# Patient Record
Sex: MALE | Race: WHITE
[De-identification: names, ages, dates, MRNs, and addresses within clinical notes are randomized per-mention and may not be internally consistent; named-entity substitution may affect disease eponyms.]

---

## 2017-02-27 ENCOUNTER — HOSPITAL ENCOUNTER (OUTPATIENT)
Age: 77
Discharge: HOME | End: 2017-02-27
Payer: MEDICARE

## 2017-02-27 DIAGNOSIS — E87.1: Primary | ICD-10-CM

## 2020-01-20 ENCOUNTER — HOSPITAL ENCOUNTER (EMERGENCY)
Dept: HOSPITAL 76 - ED | Age: 80
Discharge: HOME | End: 2020-01-20
Payer: MEDICARE

## 2020-01-20 VITALS — SYSTOLIC BLOOD PRESSURE: 170 MMHG | DIASTOLIC BLOOD PRESSURE: 69 MMHG

## 2020-01-20 DIAGNOSIS — I10: Primary | ICD-10-CM

## 2020-01-20 DIAGNOSIS — F41.9: ICD-10-CM

## 2020-01-20 DIAGNOSIS — R01.1: ICD-10-CM

## 2020-01-20 LAB
ALBUMIN DIAFP-MCNC: 4.9 G/DL (ref 3.2–5.5)
ALBUMIN/GLOB SERPL: 1.6 {RATIO} (ref 1–2.2)
ALP SERPL-CCNC: 31 IU/L (ref 42–121)
ALT SERPL W P-5'-P-CCNC: 26 IU/L (ref 10–60)
ANION GAP SERPL CALCULATED.4IONS-SCNC: 11 MMOL/L (ref 6–13)
AST SERPL W P-5'-P-CCNC: 26 IU/L (ref 10–42)
BASOPHILS NFR BLD AUTO: 0 10^3/UL (ref 0–0.1)
BASOPHILS NFR BLD AUTO: 0.4 %
BILIRUB BLD-MCNC: 1 MG/DL (ref 0.2–1)
BUN SERPL-MCNC: 20 MG/DL (ref 6–20)
CALCIUM UR-MCNC: 9.5 MG/DL (ref 8.5–10.3)
CHLORIDE SERPL-SCNC: 93 MMOL/L (ref 101–111)
CO2 SERPL-SCNC: 24 MMOL/L (ref 21–32)
CREAT SERPLBLD-SCNC: 1.1 MG/DL (ref 0.6–1.2)
EOSINOPHIL # BLD AUTO: 0.1 10^3/UL (ref 0–0.7)
EOSINOPHIL NFR BLD AUTO: 0.9 %
ERYTHROCYTE [DISTWIDTH] IN BLOOD BY AUTOMATED COUNT: 13.5 % (ref 12–15)
GFRSERPLBLD MDRD-ARVRAT: 65 ML/MIN/{1.73_M2} (ref 89–?)
GLOBULIN SER-MCNC: 3 G/DL (ref 2.1–4.2)
GLUCOSE SERPL-MCNC: 138 MG/DL (ref 70–100)
HGB UR QL STRIP: 14.9 G/DL (ref 14–18)
LIPASE SERPL-CCNC: 26 U/L (ref 22–51)
LYMPHOCYTES # SPEC AUTO: 2 10^3/UL (ref 1.5–3.5)
LYMPHOCYTES NFR BLD AUTO: 29.7 %
MCH RBC QN AUTO: 30.3 PG (ref 27–31)
MCHC RBC AUTO-ENTMCNC: 34.3 G/DL (ref 32–36)
MCV RBC AUTO: 88.6 FL (ref 80–94)
MONOCYTES # BLD AUTO: 0.5 10^3/UL (ref 0–1)
MONOCYTES NFR BLD AUTO: 6.7 %
NEUTROPHILS # BLD AUTO: 4.3 10^3/UL (ref 1.5–6.6)
NEUTROPHILS # SNV AUTO: 6.9 X10^3/UL (ref 4.8–10.8)
NEUTROPHILS NFR BLD AUTO: 61.9 %
PDW BLD AUTO: 8.4 FL (ref 7.4–11.4)
PLATELET # BLD: 240 10^3/UL (ref 130–450)
PROT SPEC-MCNC: 7.9 G/DL (ref 6.7–8.2)
RBC MAR: 4.91 10^6/UL (ref 4.7–6.1)
SODIUM SERPLBLD-SCNC: 128 MMOL/L (ref 135–145)

## 2020-01-20 PROCEDURE — 80053 COMPREHEN METABOLIC PANEL: CPT

## 2020-01-20 PROCEDURE — 36415 COLL VENOUS BLD VENIPUNCTURE: CPT

## 2020-01-20 PROCEDURE — 93005 ELECTROCARDIOGRAM TRACING: CPT

## 2020-01-20 PROCEDURE — 83690 ASSAY OF LIPASE: CPT

## 2020-01-20 PROCEDURE — 85025 COMPLETE CBC W/AUTO DIFF WBC: CPT

## 2020-01-20 PROCEDURE — 84484 ASSAY OF TROPONIN QUANT: CPT

## 2020-01-20 PROCEDURE — 99283 EMERGENCY DEPT VISIT LOW MDM: CPT

## 2020-01-20 NOTE — ED PHYSICIAN DOCUMENTATION
History of Present Illness





- Stated complaint


Stated Complaint: HIGH BP





- Chief complaint


Chief Complaint: General





- History obtained from


History obtained from: Patient





- History of Present Illness


Timing: Last night (79-year-old gentleman with longstanding hypertension, takes 

lisinopril, 40 mg at night, hydralazine 3 times daily and amlodipine 10 mg once 

a day.  He started taking his blood pressure again recently and its been 

reliably high in the last night he felt chilled and had a panic attack in the 

middle of the night.  He is seen a nephrologist before but he says he is never 

had any trouble with his kidneys, also has seen a cardiologist and no history of

heart disease.)





Review of Systems


Constitutional: reports: Reviewed and negative


Cardiac: denies: Chest pain / pressure, Palpitations, Pedal edema, Calf pain


Respiratory: denies: Dyspnea, Cough





PD PAST MEDICAL HISTORY





- Present Medications


Home Medications: 


                                Ambulatory Orders











 Medication  Instructions  Recorded  Confirmed


 


Lisinopril 40 mg PO QPM 03/29/16 03/29/16


 


amLODIPine [Norvasc] 10 mg PO DAILY 03/29/16 03/29/16


 


Lorazepam [Ativan] 1 mg PO TID PRN #7 tablet 01/20/20 


 


hydrALAZINE [Apresoline] 25 mg PO TID 01/20/20 01/20/20














- Allergies


Allergies/Adverse Reactions: 


                                    Allergies











Allergy/AdvReac Type Severity Reaction Status Date / Time


 


No Known Drug Allergies Allergy   Verified 01/20/20 18:20














PD ED PE NORMAL





- Vitals


Vital signs reviewed: Yes





- General


General: Alert and oriented X 3, No acute distress





- HEENT


HEENT: PERRL, EOMI





- Neck


Neck: Supple, no meningeal sign, No bony TTP





- Cardiac


Cardiac: RRR, Other (Subtle systolic murmur heard best at the left upper sternal

 border)





- Respiratory


Respiratory: Clear bilaterally





- Abdomen


Abdomen: Non tender





- Extremities


Extremities: No edema, No calf tenderness / cord





- Neuro


Neuro: Alert and oriented X 3, Normal speech





Results





- Vitals


Vitals: 


                               Vital Signs - 24 hr











  01/20/20





  18:13


 


Temperature 36.7 C


 


Heart Rate 84


 


Respiratory 18





Rate 


 


Blood Pressure 177/74 H


 


O2 Saturation 97








                                     Oxygen











O2 Source                      Room air

















- EKG (time done)


  ** 1825


Rate: Rate (enter#) (74)


Rhythm: NSR, FRANCESCA


Axis: Normal


Intervals: Normal LA


Ischemia: Q waves (anterior).  No: ST elevation c/w ischemia


Computer interpretation: Agree with computer





- Labs


Labs: 


                                Laboratory Tests











  01/20/20 01/20/20 01/20/20





  19:50 19:50 19:50


 


WBC  6.9  


 


RBC  4.91  


 


Hgb  14.9  


 


Hct  43.5  


 


MCV  88.6  


 


MCH  30.3  


 


MCHC  34.3  


 


RDW  13.5  


 


Plt Count  240  


 


MPV  8.4  


 


Neut # (Auto)  4.3  


 


Lymph # (Auto)  2.0  


 


Mono # (Auto)  0.5  


 


Eos # (Auto)  0.1  


 


Baso # (Auto)  0.0  


 


Absolute Nucleated RBC  0.00  


 


Nucleated RBC %  0.0  


 


Sodium   128 L 


 


Potassium   3.9 


 


Chloride   93 L 


 


Carbon Dioxide   24 


 


Anion Gap   11.0 


 


BUN   20 


 


Creatinine   1.1 


 


Estimated GFR (MDRD)   65 L 


 


Glucose   138 H 


 


Calcium   9.5 


 


Total Bilirubin   1.0 


 


AST   26 


 


ALT   26 


 


Alkaline Phosphatase   31 L 


 


Troponin I High Sens    11.6


 


Total Protein   7.9 


 


Albumin   4.9 


 


Globulin   3.0 


 


Albumin/Globulin Ratio   1.6 


 


Lipase   26 














PD MEDICAL DECISION MAKING





- ED course


ED course: 





79-year-old gentleman presents with elevated blood pressures recently, admits 

that a lot of it may be anxiety.  Did not want me to change his blood pressure 

meds tonight, plans to see his physician tomorrow for that but did want 

something for anxiety and he was given a milligram of Ativan to go.





Departure





- Departure


Disposition: 01 Home, Self Care


Clinical Impression: 


Hypertension


Qualifiers:


 Hypertension type: essential hypertension Qualified Code(s): I10 - Essential 

(primary) hypertension





Condition: Good


Record reviewed to determine appropriate education?: Yes


Instructions:  ED HTN Established


Prescriptions: 


Lorazepam [Ativan] 1 mg PO TID PRN #7 tablet


 PRN Reason: Anxiety


Comments: 


Your kidney function is good, no recent evidence of heart damage.  You can talk 

with your doctor about changing or adding to your blood pressure medicines, but 

in the meantime you can take the Ativan as needed if anxiety is bad.  Do not 

drink or drive with the Ativan.

## 2020-05-21 ENCOUNTER — HOSPITAL ENCOUNTER (OUTPATIENT)
Dept: HOSPITAL 76 - LAB.WCP | Age: 80
Discharge: HOME | End: 2020-05-21
Attending: FAMILY MEDICINE
Payer: MEDICARE

## 2020-05-21 DIAGNOSIS — I10: Primary | ICD-10-CM

## 2020-05-21 LAB
ALBUMIN DIAFP-MCNC: 4.8 G/DL (ref 3.2–5.5)
ALBUMIN/GLOB SERPL: 1.5 {RATIO} (ref 1–2.2)
ALP SERPL-CCNC: 38 IU/L (ref 42–121)
ALT SERPL W P-5'-P-CCNC: 26 IU/L (ref 10–60)
ANION GAP SERPL CALCULATED.4IONS-SCNC: 10 MMOL/L (ref 6–13)
AST SERPL W P-5'-P-CCNC: 28 IU/L (ref 10–42)
BASOPHILS NFR BLD AUTO: 0 10^3/UL (ref 0–0.1)
BASOPHILS NFR BLD AUTO: 0.4 %
BILIRUB BLD-MCNC: 0.8 MG/DL (ref 0.2–1)
BUN SERPL-MCNC: 16 MG/DL (ref 6–20)
CALCIUM UR-MCNC: 9.8 MG/DL (ref 8.5–10.3)
CHLORIDE SERPL-SCNC: 92 MMOL/L (ref 101–111)
CO2 SERPL-SCNC: 28 MMOL/L (ref 21–32)
CREAT SERPLBLD-SCNC: 0.9 MG/DL (ref 0.6–1.2)
EOSINOPHIL # BLD AUTO: 0 10^3/UL (ref 0–0.7)
EOSINOPHIL NFR BLD AUTO: 0.4 %
ERYTHROCYTE [DISTWIDTH] IN BLOOD BY AUTOMATED COUNT: 13.7 % (ref 12–15)
GLOBULIN SER-MCNC: 3.2 G/DL (ref 2.1–4.2)
GLUCOSE SERPL-MCNC: 104 MG/DL (ref 70–100)
HGB UR QL STRIP: 14 G/DL (ref 14–18)
LYMPHOCYTES # SPEC AUTO: 1.2 10^3/UL (ref 1.5–3.5)
LYMPHOCYTES NFR BLD AUTO: 21.4 %
MCH RBC QN AUTO: 29.5 PG (ref 27–31)
MCHC RBC AUTO-ENTMCNC: 33.2 G/DL (ref 32–36)
MCV RBC AUTO: 89 FL (ref 80–94)
MONOCYTES # BLD AUTO: 0.4 10^3/UL (ref 0–1)
MONOCYTES NFR BLD AUTO: 6.4 %
NEUTROPHILS # BLD AUTO: 4 10^3/UL (ref 1.5–6.6)
NEUTROPHILS # SNV AUTO: 5.6 X10^3/UL (ref 4.8–10.8)
NEUTROPHILS NFR BLD AUTO: 71.2 %
PDW BLD AUTO: 9 FL (ref 7.4–11.4)
PLATELET # BLD: 209 10^3/UL (ref 130–450)
PROT SPEC-MCNC: 8 G/DL (ref 6.7–8.2)
RBC MAR: 4.74 10^6/UL (ref 4.7–6.1)
SODIUM SERPLBLD-SCNC: 130 MMOL/L (ref 135–145)

## 2020-05-21 PROCEDURE — 84443 ASSAY THYROID STIM HORMONE: CPT

## 2020-05-21 PROCEDURE — 85025 COMPLETE CBC W/AUTO DIFF WBC: CPT

## 2020-05-21 PROCEDURE — 80053 COMPREHEN METABOLIC PANEL: CPT

## 2020-05-21 PROCEDURE — 36415 COLL VENOUS BLD VENIPUNCTURE: CPT

## 2020-07-09 ENCOUNTER — HOSPITAL ENCOUNTER (EMERGENCY)
Dept: HOSPITAL 76 - ED | Age: 80
Discharge: ANOTHER HOSPITAL | End: 2020-07-09
Payer: MEDICARE

## 2020-07-09 VITALS — DIASTOLIC BLOOD PRESSURE: 61 MMHG | SYSTOLIC BLOOD PRESSURE: 180 MMHG

## 2020-07-09 DIAGNOSIS — R94.31: ICD-10-CM

## 2020-07-09 DIAGNOSIS — R07.9: Primary | ICD-10-CM

## 2020-07-09 DIAGNOSIS — R00.1: ICD-10-CM

## 2020-07-09 DIAGNOSIS — I45.5: ICD-10-CM

## 2020-07-09 DIAGNOSIS — I10: ICD-10-CM

## 2020-07-09 DIAGNOSIS — E87.1: ICD-10-CM

## 2020-07-09 LAB
ALBUMIN DIAFP-MCNC: 4.2 G/DL (ref 3.2–5.5)
ALBUMIN/GLOB SERPL: 1.4 {RATIO} (ref 1–2.2)
ALP SERPL-CCNC: 161 IU/L (ref 42–121)
ALT SERPL W P-5'-P-CCNC: 65 IU/L (ref 10–60)
ANION GAP SERPL CALCULATED.4IONS-SCNC: 12 MMOL/L (ref 6–13)
AST SERPL W P-5'-P-CCNC: 68 IU/L (ref 10–42)
BASOPHILS NFR BLD AUTO: 0 10^3/UL (ref 0–0.1)
BASOPHILS NFR BLD AUTO: 0.3 %
BILIRUB BLD-MCNC: 1 MG/DL (ref 0.2–1)
BUN SERPL-MCNC: 15 MG/DL (ref 6–20)
CALCIUM UR-MCNC: 8.9 MG/DL (ref 8.5–10.3)
CHLORIDE SERPL-SCNC: 83 MMOL/L (ref 101–111)
CO2 SERPL-SCNC: 23 MMOL/L (ref 21–32)
CREAT SERPLBLD-SCNC: 0.8 MG/DL (ref 0.6–1.2)
EOSINOPHIL # BLD AUTO: 0 10^3/UL (ref 0–0.7)
EOSINOPHIL NFR BLD AUTO: 0.2 %
ERYTHROCYTE [DISTWIDTH] IN BLOOD BY AUTOMATED COUNT: 13.3 % (ref 12–15)
GLOBULIN SER-MCNC: 2.9 G/DL (ref 2.1–4.2)
GLUCOSE SERPL-MCNC: 128 MG/DL (ref 70–100)
HGB UR QL STRIP: 12.6 G/DL (ref 14–18)
LIPASE SERPL-CCNC: 20 U/L (ref 22–51)
LYMPHOCYTES # SPEC AUTO: 1 10^3/UL (ref 1.5–3.5)
LYMPHOCYTES NFR BLD AUTO: 10.6 %
MCH RBC QN AUTO: 30.6 PG (ref 27–31)
MCHC RBC AUTO-ENTMCNC: 35.9 G/DL (ref 32–36)
MCV RBC AUTO: 85.2 FL (ref 80–94)
MONOCYTES # BLD AUTO: 0.5 10^3/UL (ref 0–1)
MONOCYTES NFR BLD AUTO: 5.7 %
NEUTROPHILS # BLD AUTO: 7.7 10^3/UL (ref 1.5–6.6)
NEUTROPHILS # SNV AUTO: 9.4 X10^3/UL (ref 4.8–10.8)
NEUTROPHILS NFR BLD AUTO: 82.6 %
PDW BLD AUTO: 8.7 FL (ref 7.4–11.4)
PLATELET # BLD: 152 10^3/UL (ref 130–450)
PROT SPEC-MCNC: 7.1 G/DL (ref 6.7–8.2)
RBC MAR: 4.12 10^6/UL (ref 4.7–6.1)
SODIUM SERPLBLD-SCNC: 118 MMOL/L (ref 135–145)

## 2020-07-09 PROCEDURE — 83690 ASSAY OF LIPASE: CPT

## 2020-07-09 PROCEDURE — 93005 ELECTROCARDIOGRAM TRACING: CPT

## 2020-07-09 PROCEDURE — 36415 COLL VENOUS BLD VENIPUNCTURE: CPT

## 2020-07-09 PROCEDURE — 71045 X-RAY EXAM CHEST 1 VIEW: CPT

## 2020-07-09 PROCEDURE — 84484 ASSAY OF TROPONIN QUANT: CPT

## 2020-07-09 PROCEDURE — 85025 COMPLETE CBC W/AUTO DIFF WBC: CPT

## 2020-07-09 PROCEDURE — 80053 COMPREHEN METABOLIC PANEL: CPT

## 2020-07-09 PROCEDURE — 99285 EMERGENCY DEPT VISIT HI MDM: CPT

## 2020-07-09 PROCEDURE — 96374 THER/PROPH/DIAG INJ IV PUSH: CPT

## 2020-07-09 NOTE — ED PHYSICIAN DOCUMENTATION
PD HPI CHEST PAIN





- Stated complaint


Stated Complaint: CP/NECK PX





- History obtained from


History obtained from: Patient, Family





- History of Present Illness


Timing - onset: Other (unclear duration (see narrative below))


Timing - onset during: Sleep


Timing - details: Abrupt onset


Pain level now: 3


Quality: Pain


Location: Substernal


Radiation: Neck, Abdominal


Improved by: Nothing


Worsened by: Other (no exacerbating factors)


Associated symptoms: General Weakness


Similar symptoms before: Has not had sx before


Recently seen: Not recently seen





- Additional information


Additional information: 





c/o chest pain as part of a vague HPI that includes neck and upper abdominal 

pain. When I ask him to specifically tell me about the chest pain, he insists on

giving details about changes in his blood pressure medications and doses that 

occurred several weeks ago. He does offer chest pain as part of his chief 

complaint. He also says he feels "sick" and "not myself", as well as difficulty 

with mental focus. 





Review of Systems


Constitutional: reports: Fatigue.  denies: Fever, Chills, Sweats


Eyes: reports: Reviewed and negative


Ears: reports: Reviewed and negative


Nose: reports: Reviewed and negative


Throat: reports: Reviewed and negative


Cardiac: reports: Chest pain / pressure.  denies: Palpitations, Pedal edema, 

Calf pain


Respiratory: denies: Dyspnea, Cough


GI: reports: Abdominal Pain.  denies: Nausea, Vomiting


: denies: Dysuria, Frequency


Skin: reports: Reviewed and negative


Musculoskeletal: reports: Neck pain.  denies: Back pain, Extremity pain


Neurologic: denies: Generalized weakness, Focal weakness, Numbness, Headache, 

Head injury





PD PAST MEDICAL HISTORY





- Past Medical History


Cardiovascular: Hypertension, High cholesterol


Respiratory: None


Neuro: None


Endocrine/Autoimmune: None


GI: None


: None


HEENT: None


Psych: None


Musculoskeletal: None


Derm: None





- Past Surgical History


Past Surgical History: Yes





- Present Medications


Home Medications: 


                                Ambulatory Orders











 Medication  Instructions  Recorded  Confirmed


 


Lisinopril 40 mg PO QPM 03/29/16 03/29/16


 


amLODIPine [Norvasc] 10 mg PO DAILY 03/29/16 03/29/16


 


Lorazepam [Ativan] 1 mg PO TID PRN #7 tablet 01/20/20 


 


hydrALAZINE [Apresoline] 25 mg PO TID 01/20/20 01/20/20














- Allergies


Allergies/Adverse Reactions: 


                                    Allergies











Allergy/AdvReac Type Severity Reaction Status Date / Time


 


No Known Drug Allergies Allergy   Verified 01/20/20 18:20














- Social History


Does the pt smoke?: No


Smoking Status: Never smoker


Does the pt drink ETOH?: Yes


Does the pt have substance abuse?: No





- Immunizations


Immunizations are current?: Yes





- POLST


Patient has POLST: No





PD ED PE NORMAL





- Vitals


Vital signs reviewed: Yes





- General


General: Alert and oriented X 3, No acute distress, Well developed/nourished, 

Other (tangential at times)





- HEENT


HEENT: PERRL, EOMI





- Neck


Neck: Supple, no meningeal sign





- Cardiac


Cardiac: RRR, No murmur





- Respiratory


Respiratory: No respiratory distress, Clear bilaterally





- Abdomen


Abdomen: Soft, Non tender





- Derm


Derm: Normal color, Warm and dry





- Extremities


Extremities: No edema





- Neuro


Neuro: Alert and oriented X 3, CNs 2-12 intact, No motor deficit, No sensory 

deficit, Normal speech


Eye Opening: Spontaneous


Motor: Obeys Commands


Verbal: Oriented


GCS Score: 15





Results





- Vitals


Vitals: 


                               Vital Signs - 24 hr











  07/09/20 07/09/20 07/09/20





  04:00 04:23 04:27


 


Temperature 36.5 C  


 


Heart Rate 54 L 53 L 


 


Respiratory 22 14 





Rate   


 


Blood Pressure 213/70 H  


 


Blood Pressure   213/70 H





[Left]   


 


Blood Pressure   221/70 H





[Right]   


 


O2 Saturation 100 98 














  07/09/20 07/09/20 07/09/20





  04:30 04:41 04:44


 


Temperature   


 


Heart Rate 52 L 73 57 L


 


Respiratory 14 20 20





Rate   


 


Blood Pressure 198/60 H 131/63 H 178/75 H


 


Blood Pressure   





[Left]   


 


Blood Pressure   





[Right]   


 


O2 Saturation 99 99 98














  07/09/20 07/09/20 07/09/20





  04:47 04:51 04:57


 


Temperature   


 


Heart Rate 49 L 48 L 47 L


 


Respiratory 20 18 17





Rate   


 


Blood Pressure 178/61 H 182/65 H 180/61 H


 


Blood Pressure   





[Left]   


 


Blood Pressure   





[Right]   


 


O2 Saturation 98 99 98








                                     Oxygen











O2 Source                      Nasal cannula

















- EKG (time done)


  ** #1


Rate: Rate (enter#) (51), Moises


Rhythm: Sinus bradycardia


Axis: Normal


Intervals: Normal GA


QRS: Normal


Ischemia: Q waves (V2, V3), Other (minimal ST elevation V2, V3 )





  ** #2


Rate: Rate (enter#) (54), Moises


Rhythm: Sinus bradycardia


Axis: Normal


Intervals: Normal GA


QRS: Normal


Ischemia: Q waves (V2, V3), Other (minimal ST elevation V2, V3)





  ** #3


Rate: Rate (enter#) (48)


Rhythm: Sinus bradycardia


Axis: Normal


Intervals: Normal GA


QRS: Normal


Ischemia: Q waves (V2, V3), Other (minimal ST elevation (up to 1 mm) V2, V3)





- Labs


Labs: 


                                Laboratory Tests











  07/09/20 07/09/20 07/09/20





  04:11 04:11 04:11


 


WBC  9.4  


 


RBC  4.12 L  


 


Hgb  12.6 L  


 


Hct  35.1 L  


 


MCV  85.2  


 


MCH  30.6  


 


MCHC  35.9  


 


RDW  13.3  


 


Plt Count  152  


 


MPV  8.7  


 


Neut # (Auto)  7.7 H  


 


Lymph # (Auto)  1.0 L  


 


Mono # (Auto)  0.5  


 


Eos # (Auto)  0.0  


 


Baso # (Auto)  0.0  


 


Absolute Nucleated RBC  0.00  


 


Nucleated RBC %  0.0  


 


Sodium   118 L* 


 


Potassium   4.3 


 


Chloride   83 L 


 


Carbon Dioxide   23 


 


Anion Gap   12.0 


 


BUN   15 


 


Creatinine   0.8 


 


Estimated GFR (MDRD)   93 


 


Glucose   128 H 


 


Calcium   8.9 


 


Total Bilirubin   1.0 


 


AST   68 H 


 


ALT   65 H 


 


Alkaline Phosphatase   161 H 


 


Troponin I High Sens    118.1 H*


 


Total Protein   7.1 


 


Albumin   4.2 


 


Globulin   2.9 


 


Albumin/Globulin Ratio   1.4 


 


Lipase   20 L 














PD MEDICAL DECISION MAKING





- ED course


Complexity details: reviewed old records, reviewed results, re-evaluated 

patient, considered differential, d/w patient, d/w family


ED course: 





patient c/o chest pain as part of a larger and vague HPI that includes neck and 

upper abdominal pain. He is unable to provide specifics regarding the chest pain

 (such as time of onset, intensity, h/o similar discomfort). His blood tests 

reveal sodium level 118, which could account for many of his symptoms (including

 difficulty focusing his thoughts), but his EKG is concerning for ST elevation 

V2 V3 (Q waves in these leads were seen on previous EKG January 2020). 


D/W Dr. Rebolledo (ED MD at [facility]), accepts patient for transfer for possible STEMI.

 Patient was given SLNTG, PO ASA, and IV heparin. Shortly after these meds were 

given, I was called back into the room, as patient had sudden LOC with several 

witnesses in the room at the time including the medics that were here to 

transport patient to [facility]; they describe seeing bradycardia on monitor rapidly 

followed by sinus arrest. Patient was unconscious when I reassessed him with 

left-moses gaze and tonic stiffness to his body, unresponsive. He was bradycardic

 on the monitor with faint carotid pulse, heart rate on monitor appeared to be 

in the 20s but artifact also initially precluded a clear rhythm and rate 

interpretation. As pads were being placed and code cart was being brought to 

room, he had increasingly strong pulses (radial) correlating with increasing 

heart rate to 40s and then lower 50s. He had return to baseline LOC within 2-3 

minutes from onset of event and reported feeling no better or worse than when I 

first assessed him on initial presentation. Plan for mode of transfer changed to

 air transport. 





Departure





- Departure


Disposition: 02 Transfer Acute Care Hosp


Clinical Impression: 


 Hyponatremia, Symptomatic bradycardia





Chest pain


Qualifiers:


 Chest pain type: unspecified Qualified Code(s): R07.9 - Chest pain, unspecified





Condition: Stable


Discharge Date/Time: 07/09/20 05:16

## 2020-07-09 NOTE — XRAY REPORT
PROCEDURE:  Chest 1 View X-Ray

 

INDICATIONS:  Chest pain

 

TECHNIQUE:  One view of the chest was acquired.  

 

COMPARISON:  2/27/2017

 

FINDINGS:  

 

Surgical changes and devices:  None.  

 

Lungs and pleura:  No pleural effusions or pneumothorax.  Lungs are clear.  

 

Mediastinum:  Mediastinal contours appear normal.  Heart size is normal.  

 

Bones and chest wall:  No suspicious bony lesions.  Overlying soft tissues appear unremarkable.  

 

IMPRESSION:  

No acute cardiopulmonary pathology.

 

No discrepancies.

 

Reviewed by: Manny Perez MD on 7/9/2020 8:27 AM PDT

Approved by: Manny Perez MD on 7/9/2020 8:27 AM PDT

 

 

Station ID:  [id number]

## 2020-07-23 ENCOUNTER — HOSPITAL ENCOUNTER (OUTPATIENT)
Dept: HOSPITAL 76 - EMS | Age: 80
End: 2020-07-23
Attending: SURGERY
Payer: MEDICARE

## 2020-07-23 DIAGNOSIS — Z53.29: ICD-10-CM

## 2020-07-23 DIAGNOSIS — R53.1: Primary | ICD-10-CM
